# Patient Record
Sex: MALE | Race: WHITE | NOT HISPANIC OR LATINO | Employment: OTHER | URBAN - METROPOLITAN AREA
[De-identification: names, ages, dates, MRNs, and addresses within clinical notes are randomized per-mention and may not be internally consistent; named-entity substitution may affect disease eponyms.]

---

## 2018-01-24 ENCOUNTER — OFFICE VISIT (OUTPATIENT)
Dept: URGENT CARE | Facility: CLINIC | Age: 48
End: 2018-01-24
Payer: MEDICAID

## 2018-01-24 VITALS
HEART RATE: 72 BPM | HEIGHT: 70 IN | SYSTOLIC BLOOD PRESSURE: 137 MMHG | TEMPERATURE: 97 F | WEIGHT: 185 LBS | DIASTOLIC BLOOD PRESSURE: 77 MMHG | BODY MASS INDEX: 26.48 KG/M2 | RESPIRATION RATE: 18 BRPM | OXYGEN SATURATION: 99 %

## 2018-01-24 DIAGNOSIS — L72.0 INFECTED EPIDERMOID CYST: Primary | ICD-10-CM

## 2018-01-24 DIAGNOSIS — L08.9 INFECTED EPIDERMOID CYST: Primary | ICD-10-CM

## 2018-01-24 PROCEDURE — 99213 OFFICE O/P EST LOW 20 MIN: CPT | Mod: S$GLB,,, | Performed by: NURSE PRACTITIONER

## 2018-01-24 RX ORDER — NAPROXEN SODIUM 220 MG/1
81 TABLET, FILM COATED ORAL DAILY
COMMUNITY

## 2018-01-24 RX ORDER — IBUPROFEN 600 MG/1
600 TABLET ORAL EVERY 6 HOURS PRN
Qty: 30 TABLET | Refills: 0 | Status: SHIPPED | OUTPATIENT
Start: 2018-01-24 | End: 2018-01-31

## 2018-01-24 RX ORDER — SULFAMETHOXAZOLE AND TRIMETHOPRIM 800; 160 MG/1; MG/1
1 TABLET ORAL 2 TIMES DAILY
Qty: 20 TABLET | Refills: 0 | Status: SHIPPED | OUTPATIENT
Start: 2018-01-24 | End: 2018-02-03

## 2018-01-24 NOTE — PATIENT INSTRUCTIONS
Please arrange follow up with your primary medical clinic as soon as possible. You must understand that you've received an Urgent Care treatment only and that you may be released before all of your medical problems are known or treated. You, the patient, will arrange for follow up as instructed. If your symptoms worsen or fail to improve you should go to the Emergency Room.      Understanding Epidermoid Cyst    An epidermoid cyst is a small abnormal growth in the top layers of the skin. It is filled with keratin, the same proteins that make up your hair and nails. These cysts grow slowly. They can grow anywhere on the body. But they are most often found on the face, behind the ears, and on the chest or upper back.  How to say it  [id number]   What causes an epidermoid cyst?  An epidermoid cyst may occur because of an injury to the skin or from acne.  Symptoms of an epidermoid cyst  An epidermoid cyst is a subcutaneous bump. This means it is just below the skin. It may be yellow or skin-colored. It often has a small black jan in the middle of it, like a blackhead.  An epidermoid cyst rarely causes pain, unless it ruptures or becomes infected. It may ooze keratin, a white, cheesy, smelly material. If the cyst becomes inflamed or infected, it may be:  · Bad smelling  · Red  · Swollen  · Tender  Treatment for an epidermoid cyst  Many epidermoid cysts dont cause any problems. They dont necessarily need to be treated. They may go away on their own. But you may want to treat it if you dont like how it looks or it becomes inflamed.  Treatment options include:  · Steroid injection. A steroid may be injected into the cyst. This may help ease inflammation. It may also prevent infection.  · Surgical removal. The cyst can be cut out. It may also need to be drained first. There is a slight chance the cyst may come back.  · Antibiotics. In some cases, you may need to take an oral antibiotic to prevent infection and  regrowth.  When to call your healthcare provider  Call your healthcare provider right away if you have any of these:  · Fever of 100.4°F (38°C) or higher, or as directed  · Redness, swelling, or fluid leaking from your incision that gets worse  · Pain that gets worse  · Symptoms that dont get better, or get worse  · New symptoms   Date Last Reviewed: 5/1/2016  © 3960-7712 [employer]. [address] [place] [address], [place] [address]. All rights reserved. This information is not intended as a substitute for professional medical care. Always follow your healthcare professional's instructions.        Epidermoid Cyst (Sebaceous Cyst), Infected (Antibiotic Treatment)  You have an epidermoid cyst. This is a small, painless lump under your skin. An epidermoid cyst (often called a sebaceous cyst, epidermal cyst, or epidermal inclusion cyst) is a term most often used for 2 similar types of cysts:  · Epidermoid cysts. These cysts form slowly under the skin. They can be found on most parts of the body. But they are most often found on areas with more hair such as the scalp, face, upper back, and genitals.  · Pilar cysts. These are similar to epidermoid cysts. But they start from a different part of the hair follicle. They are more likely to be on the scalp.  Here are some general facts about these cysts:  · A cyst is a sac filled with material that is often cheesy, fatty, oily, or stringy. The material inside can be thick. Or it can be a liquid.  · You can usually move the cyst slightly if you try.  · The cysts can be smaller than a pea or as large as a few inches.  · The cysts are usually not painful, unless they become inflamed or infected.  · The area around the cyst may smell bad. If the cyst breaks open, the material inside it often smells bad as well.  Your cyst became infected and your healthcare provider wanted to treat it with antibiotics. If the antibiotics dont clear up the infection, the cyst will need to  be drained by making a small cut (incision). Local anesthesia will be used to numb the area.  Home care  · Resist the temptation to squeeze or pop the cyst, stick a needle in it, or cut it open. This often leads to a worsening infection and scarring.  · Take the antibiotic as directed until it is all used up.  · Soak the affected area in hot water or apply a hot pack (a thin, clean towel soaked in hot water) for 20 minutes at a time. Do this 3 to 4 times a day.  · Apply antibiotic cream or ointment 3 times a day.  · You may use over-the-counter pain medicine to control pain, unless another medicine was given. If you have chronic liver or kidney disease or ever had a stomach ulcer or GI bleeding, talk with your healthcare provider before using these medicines.  Prevention  Once this infection has healed, reduce the risk of future infections by:  · Keeping the cyst area clean by bathing or showering daily  · Avoiding tight-fitting clothing in the cyst area  Follow-up care  Follow up with your healthcare provider, or as advised. If a gauze packing was put in your wound, it should be removed in a few days as advised by your healthcare provider. Check your wound every day for the signs listed below.  When to seek medical advice  Call your healthcare provider right away if any of these occur:  · Pus coming from the cyst  · Increasing redness around the wound  · Increasing local pain or swelling  · Fever of 100.4°F (38ºC) or higher, or as directed by your provider  Date Last Reviewed: 10/5/2016  © 2761-0090 [employer]. [address] [place] [address], [place] [address]. All rights reserved. This information is not intended as a substitute for professional medical care. Always follow your healthcare professional's instructions.

## 2018-01-24 NOTE — PROGRESS NOTES
"Subjective:       Patient ID: Pedro Melgar is a 47 y.o. male.    Vitals:  height is 5' 10" (1.778 m) and weight is 83.9 kg (185 lb). His oral temperature is 97.4 °F (36.3 °C). His blood pressure is 137/77 and his pulse is 72. His respiration is 18 and oxygen saturation is 99%.     Chief Complaint: Abscess    Cyst on upper back is inflamed and painful. Cyst has been present for several months but became painful last week      Abscess   Chronicity:  ChronicProgression Since Onset: gradually worsening  Abscess location: back.  Associated Symptoms: no fever, no chills  Characteristics: painful, redness and swelling    Characteristics: not draining    Pain Scale:  6/10  Treatments Tried:  Warm compresses and draining/squeezing  Relieved by:  Nothing  Worsened by:  Draining/squeezing    Review of Systems   Constitution: Negative for chills and fever.   HENT: Negative for sore throat.    Respiratory: Negative for shortness of breath.    Skin: Positive for color change and suspicious lesions. Negative for itching and rash.   Musculoskeletal: Negative for joint pain.   All other systems reviewed and are negative.      Objective:      Physical Exam   Constitutional: He is oriented to person, place, and time. He appears well-developed and well-nourished.   HENT:   Head: Normocephalic and atraumatic.   Right Ear: Hearing, tympanic membrane, external ear and ear canal normal. Tympanic membrane is not erythematous.   Left Ear: Hearing, tympanic membrane, external ear and ear canal normal. Tympanic membrane is not erythematous.   Nose: Nose normal. No mucosal edema or rhinorrhea. Right sinus exhibits no maxillary sinus tenderness and no frontal sinus tenderness. Left sinus exhibits no maxillary sinus tenderness and no frontal sinus tenderness.   Mouth/Throat: Uvula is midline, oropharynx is clear and moist and mucous membranes are normal. No posterior oropharyngeal edema or posterior oropharyngeal erythema.   Eyes: " Conjunctivae, EOM and lids are normal. Right conjunctiva is not injected. Left conjunctiva is not injected.   Neck: Normal range of motion and full passive range of motion without pain. Neck supple.   Cardiovascular: Normal rate, regular rhythm, S1 normal, S2 normal, normal heart sounds, intact distal pulses and normal pulses.    Pulmonary/Chest: Effort normal and breath sounds normal. No respiratory distress. He has no decreased breath sounds. He has no wheezes.   Neurological: He is alert and oriented to person, place, and time. He has normal strength. No cranial nerve deficit or sensory deficit. GCS eye subscore is 4. GCS verbal subscore is 5. GCS motor subscore is 6.   Skin: Skin is warm and dry. There is erythema.        Quarter sized cystic mass + erythema. No drainage/fluctuance    Nursing note and vitals reviewed.      Assessment:       1. Infected epidermoid cyst        Plan:         Infected epidermoid cyst  -     Ambulatory referral to General Surgery    Other orders  -     sulfamethoxazole-trimethoprim 800-160mg (BACTRIM DS) 800-160 mg Tab; Take 1 tablet by mouth 2 (two) times daily.  Dispense: 20 tablet; Refill: 0  -     ibuprofen (ADVIL,MOTRIN) 600 MG tablet; Take 1 tablet (600 mg total) by mouth every 6 (six) hours as needed for Pain.  Dispense: 30 tablet; Refill: 0

## 2018-02-15 ENCOUNTER — OFFICE VISIT (OUTPATIENT)
Dept: SURGERY | Facility: CLINIC | Age: 48
End: 2018-02-15
Payer: MEDICAID

## 2018-02-15 VITALS
BODY MASS INDEX: 26.65 KG/M2 | DIASTOLIC BLOOD PRESSURE: 72 MMHG | SYSTOLIC BLOOD PRESSURE: 101 MMHG | HEIGHT: 70 IN | WEIGHT: 186.19 LBS | HEART RATE: 70 BPM | TEMPERATURE: 98 F

## 2018-02-15 DIAGNOSIS — L72.3 SEBACEOUS CYST: Primary | ICD-10-CM

## 2018-02-15 PROCEDURE — 99213 OFFICE O/P EST LOW 20 MIN: CPT | Mod: PBBFAC | Performed by: SURGERY

## 2018-02-15 PROCEDURE — 10060 I&D ABSCESS SIMPLE/SINGLE: CPT | Mod: PBBFAC | Performed by: SURGERY

## 2018-02-15 PROCEDURE — 10060 I&D ABSCESS SIMPLE/SINGLE: CPT | Mod: S$PBB,,, | Performed by: SURGERY

## 2018-02-15 PROCEDURE — 99999 PR PBB SHADOW E&M-EST. PATIENT-LVL III: CPT | Mod: PBBFAC,,, | Performed by: SURGERY

## 2018-02-15 PROCEDURE — 3008F BODY MASS INDEX DOCD: CPT | Mod: ,,, | Performed by: SURGERY

## 2018-02-15 PROCEDURE — 99203 OFFICE O/P NEW LOW 30 MIN: CPT | Mod: S$PBB,25,, | Performed by: SURGERY

## 2018-02-15 NOTE — PROGRESS NOTES
Office Visit  General Surgery    SUBJECTIVE:     History of Present Illness:  Patient is a 47 y.o. male with a history of CAD presents with infected sebaceous cyst on the left posterior medial to the scapula. The cyst has been present for approximately 5 years but has started growing 4 weeks ago.  The cyst became tender and red.  The patient was treated with Bactrim which he finished 1 week ago.  The patient states that the cyst has been oozing yellowish discharge.  The patient denies fevers and chills.  The patient had a similar sebaceous cyst removed many years prior on the right posterior medial to the scapula.    Chief Complaint   Patient presents with    Consult       Review of patient's allergies indicates:   Allergen Reactions    Naproxen Other (See Comments)     Causes Constipation       Current Outpatient Prescriptions   Medication Sig Dispense Refill    aspirin 81 MG Chew Take 81 mg by mouth once daily.       No current facility-administered medications for this visit.        Past Medical History:   Diagnosis Date    Coronary artery disease     No surgery     Past Surgical History:   Procedure Laterality Date    FRACTURE SURGERY Left 09/2016    ORIF Humerus    FRACTURE SURGERY Left 09/2016    Elbow, no hardware    FRACTURE SURGERY Left 09/2016    Left Scaphoid in wrist    FRACTURE SURGERY  09/2016    Jaw and mouth     FRACTURE SURGERY Right 2012    Elbow     Family History   Problem Relation Age of Onset    Hypertension Mother     Heart disease Mother     Hyperlipidemia Father      Social History   Substance Use Topics    Smoking status: Light Tobacco Smoker    Smokeless tobacco: Never Used    Alcohol use 1.8 oz/week     1 Glasses of wine, 2 Cans of beer per week      Comment: Daily        Review of Systems:  Review of Systems - For pertinent positives please see HPI   Constitutional: Negative for fever and chills.   HENT: Negative for congestion and ear pain.   Respiratory: Negative for  "cough and shortness of breath.   Cardiovascular: Negative for chest pain and palpitations.   Gastrointestinal: Negative for nausea, vomiting, abdominal pain, diarrhea, constipation and abdominal distention.   Genitourinary: Negative for dysuria and hematuria.   Musculoskeletal: Negative for myalgias and arthralgias.   Skin: Sebaceous cyst on left posterior medial to scapula. Slightly erythematous. Negative for rash and wound.   Neurological: Negative for weakness and numbness.   Psychiatric/Behavioral: Negative for confusion and dysphoric mood    OBJECTIVE:     Vital Signs (Most Recent)  Temp: 97.6 °F (36.4 °C) (02/15/18 1002)  Pulse: 70 (02/15/18 1002)  BP: 101/72 (02/15/18 1002)  5' 10" (1.778 m)  5' 10" (1.778 m)     Physical Exam:  NAD  A&O x3   infected sebaceous cyst just to the left of the scapula, posterior medial back  Oozing yellowish discharge upon palpation right most edge  Cyst is non-tender but slightly erythematous   Not is respiratory distress  RRR   Ext wwp        ASSESSMENT/PLAN:     Infected sebaceous cyst  -I&D in office  --Local anesthesia injected intradermally  --site cleaned with CHG  --8mm punch biopsy used to incise and drain infected sebum  --Cyst cavity bluntly debrided with forceps  --Packed  --Required nylon 3-0 sutures in figure of eight fashion for hemostasis    -RTC 2 weeks for suture removal  -No Abx indicated     I have personally performed a detailed history and physical examination on this patient. My findings are summarized in the resident's note included in the record.  "

## 2018-02-15 NOTE — LETTER
February 15, 2018      Cynthia Mohan, DAMION  [address] [facility] [place] [address]           [facility]  [address]  [place] [address]  Phone: [phone]          Patient: Pedro Melgar   MR Number: [id number]   YOB: 1970   Date of Visit: 2/15/2018       Dear Cynthia Mohan:    Thank you for referring Pedro Melgar to me for evaluation. Attached you will find relevant portions of my assessment and plan of care.    If you have questions, please do not hesitate to call me. I look forward to following Pedro Melgar along with you.    Sincerely,    Chiki Bland MD    Enclosure  CC:  No Recipients    If you would like to receive this communication electronically, please contact [email or web address] or [phone] to request more information on [employer] Link access.    For providers and/or their staff who would like to refer a patient to [facility], please contact us through our one-stop-shop provider referral line, [facility], at [phone].    If you feel you have received this communication in error or would no longer like to receive these types of communications, please e-mail [email or web address]

## 2020-07-12 ENCOUNTER — HOSPITAL ENCOUNTER (EMERGENCY)
Facility: OTHER | Age: 50
Discharge: HOME | End: 2020-07-12
Attending: EMERGENCY MEDICINE
Payer: MEDICAID

## 2020-07-12 VITALS
HEIGHT: 70 IN | SYSTOLIC BLOOD PRESSURE: 116 MMHG | BODY MASS INDEX: 26.48 KG/M2 | DIASTOLIC BLOOD PRESSURE: 73 MMHG | OXYGEN SATURATION: 98 % | RESPIRATION RATE: 18 BRPM | TEMPERATURE: 98 F | HEART RATE: 65 BPM | WEIGHT: 185 LBS

## 2020-07-12 DIAGNOSIS — M79.5 FOREIGN BODY (FB) IN SOFT TISSUE: ICD-10-CM

## 2020-07-12 DIAGNOSIS — L03.115 CELLULITIS OF RIGHT LOWER EXTREMITY: ICD-10-CM

## 2020-07-12 DIAGNOSIS — T14.8XXA PUNCTURE WOUND: Primary | ICD-10-CM

## 2020-07-12 LAB
ALBUMIN SERPL BCP-MCNC: 4 G/DL (ref 3.5–5.2)
ALP SERPL-CCNC: 80 U/L (ref 55–135)
ALT SERPL W/O P-5'-P-CCNC: 24 U/L (ref 10–44)
ANION GAP SERPL CALC-SCNC: 13 MMOL/L (ref 8–16)
AST SERPL-CCNC: 24 U/L (ref 10–40)
BASOPHILS # BLD AUTO: 0.02 K/UL (ref 0–0.2)
BASOPHILS NFR BLD: 0.2 % (ref 0–1.9)
BILIRUB SERPL-MCNC: 2.2 MG/DL (ref 0.1–1)
BUN SERPL-MCNC: 15 MG/DL (ref 6–20)
CALCIUM SERPL-MCNC: 9.1 MG/DL (ref 8.7–10.5)
CHLORIDE SERPL-SCNC: 105 MMOL/L (ref 95–110)
CO2 SERPL-SCNC: 20 MMOL/L (ref 23–29)
CREAT SERPL-MCNC: 0.7 MG/DL (ref 0.5–1.4)
DIFFERENTIAL METHOD: ABNORMAL
EOSINOPHIL # BLD AUTO: 0.2 K/UL (ref 0–0.5)
EOSINOPHIL NFR BLD: 1.9 % (ref 0–8)
ERYTHROCYTE [DISTWIDTH] IN BLOOD BY AUTOMATED COUNT: 11.8 % (ref 11.5–14.5)
EST. GFR  (AFRICAN AMERICAN): >60 ML/MIN/1.73 M^2
EST. GFR  (NON AFRICAN AMERICAN): >60 ML/MIN/1.73 M^2
GLUCOSE SERPL-MCNC: 97 MG/DL (ref 70–110)
HCT VFR BLD AUTO: 44.5 % (ref 40–54)
HGB BLD-MCNC: 15.2 G/DL (ref 14–18)
IMM GRANULOCYTES # BLD AUTO: 0.02 K/UL (ref 0–0.04)
IMM GRANULOCYTES NFR BLD AUTO: 0.2 % (ref 0–0.5)
LACTATE SERPL-SCNC: 1 MMOL/L (ref 0.5–2.2)
LYMPHOCYTES # BLD AUTO: 1.3 K/UL (ref 1–4.8)
LYMPHOCYTES NFR BLD: 15.9 % (ref 18–48)
MCH RBC QN AUTO: 32.7 PG (ref 27–31)
MCHC RBC AUTO-ENTMCNC: 34.2 G/DL (ref 32–36)
MCV RBC AUTO: 96 FL (ref 82–98)
MONOCYTES # BLD AUTO: 1.1 K/UL (ref 0.3–1)
MONOCYTES NFR BLD: 12.9 % (ref 4–15)
NEUTROPHILS # BLD AUTO: 5.7 K/UL (ref 1.8–7.7)
NEUTROPHILS NFR BLD: 68.9 % (ref 38–73)
NRBC BLD-RTO: 0 /100 WBC
PLATELET # BLD AUTO: 166 K/UL (ref 150–350)
PMV BLD AUTO: 11.9 FL (ref 9.2–12.9)
POTASSIUM SERPL-SCNC: 4.3 MMOL/L (ref 3.5–5.1)
PROT SERPL-MCNC: 7.1 G/DL (ref 6–8.4)
RBC # BLD AUTO: 4.65 M/UL (ref 4.6–6.2)
SODIUM SERPL-SCNC: 138 MMOL/L (ref 136–145)
WBC # BLD AUTO: 8.22 K/UL (ref 3.9–12.7)

## 2020-07-12 PROCEDURE — 25000003 PHARM REV CODE 250: Performed by: PHYSICIAN ASSISTANT

## 2020-07-12 PROCEDURE — 96365 THER/PROPH/DIAG IV INF INIT: CPT

## 2020-07-12 PROCEDURE — 85025 COMPLETE CBC W/AUTO DIFF WBC: CPT

## 2020-07-12 PROCEDURE — 99284 EMERGENCY DEPT VISIT MOD MDM: CPT | Mod: 25

## 2020-07-12 PROCEDURE — 87040 BLOOD CULTURE FOR BACTERIA: CPT

## 2020-07-12 PROCEDURE — 80053 COMPREHEN METABOLIC PANEL: CPT

## 2020-07-12 PROCEDURE — 83605 ASSAY OF LACTIC ACID: CPT

## 2020-07-12 RX ORDER — DOXYCYCLINE HYCLATE 100 MG
100 TABLET ORAL
Status: COMPLETED | OUTPATIENT
Start: 2020-07-12 | End: 2020-07-12

## 2020-07-12 RX ORDER — DOXYCYCLINE 100 MG/1
100 CAPSULE ORAL 2 TIMES DAILY
Qty: 20 CAPSULE | Refills: 0 | Status: SHIPPED | OUTPATIENT
Start: 2020-07-12 | End: 2020-07-22

## 2020-07-12 RX ORDER — HYDROCODONE BITARTRATE AND ACETAMINOPHEN 5; 325 MG/1; MG/1
1 TABLET ORAL EVERY 4 HOURS PRN
Qty: 10 TABLET | Refills: 0 | Status: SHIPPED | OUTPATIENT
Start: 2020-07-12

## 2020-07-12 RX ORDER — IBUPROFEN 600 MG/1
600 TABLET ORAL EVERY 6 HOURS PRN
Qty: 20 TABLET | Refills: 0 | Status: SHIPPED | OUTPATIENT
Start: 2020-07-12

## 2020-07-12 RX ORDER — CLINDAMYCIN HYDROCHLORIDE 300 MG/1
300 CAPSULE ORAL 3 TIMES DAILY
Qty: 21 CAPSULE | Refills: 0 | Status: SHIPPED | OUTPATIENT
Start: 2020-07-12 | End: 2020-07-19

## 2020-07-12 RX ORDER — CLINDAMYCIN PHOSPHATE 600 MG/50ML
600 INJECTION, SOLUTION INTRAVENOUS
Status: COMPLETED | OUTPATIENT
Start: 2020-07-12 | End: 2020-07-12

## 2020-07-12 RX ADMIN — CLINDAMYCIN IN 5 PERCENT DEXTROSE 600 MG: 12 INJECTION, SOLUTION INTRAVENOUS at 12:07

## 2020-07-12 RX ADMIN — DOXYCYCLINE HYCLATE 100 MG: 100 TABLET, COATED ORAL at 12:07

## 2020-07-12 NOTE — ED PROVIDER NOTES
Encounter Date: 7/12/2020       History     Chief Complaint   Patient presents with    Puncture Wound     2 barbs stuck in the R ankle/shin from catching Catfish on Friday     Afebrile 50-year-old male with PMH of CAD however no other reported history of immunocompromise state presents the ED for evaluation of wound.  Patient states that he was fishing and brackish water on Friday.  States that he sustained a puncture wound to the right anterior lower shin and ankle from a catfish eric.  He states that he was and able to remove the eric himself so he cut off the eric.  He states that since Friday he has had increased pain and swelling to the area.  He states pain is located to the anterior aspect of the ankle however the swelling has become more diffuse.  He does note some redness to the area.  He states pain is worse with some movement and palpation.  He does state that he is able to bear weight.  He denies any fever, chills, drainage from the site or additional complaints.  He does report that his tetanus is within the past 5 years.  He has soak the area with Epson salt and applied over-the-counter Neosporin with no significant improvement. He reports tetanus is UTD    The history is provided by the patient.     Review of patient's allergies indicates:   Allergen Reactions    Naproxen Other (See Comments)     Causes Constipation     Past Medical History:   Diagnosis Date    Coronary artery disease     No surgery     Past Surgical History:   Procedure Laterality Date    FRACTURE SURGERY Left 09/2016    ORIF Humerus    FRACTURE SURGERY Left 09/2016    Elbow, no hardware    FRACTURE SURGERY Left 09/2016    Left Scaphoid in wrist    FRACTURE SURGERY  09/2016    Jaw and mouth     FRACTURE SURGERY Right 2012    Elbow     Family History   Problem Relation Age of Onset    Hypertension Mother     Heart disease Mother     Hyperlipidemia Father      Social History     Tobacco Use    Smoking status: Light Tobacco  Smoker    Smokeless tobacco: Never Used   Substance Use Topics    Alcohol use: Yes     Alcohol/week: 3.0 standard drinks     Types: 1 Glasses of wine, 2 Cans of beer per week     Comment: Daily    Drug use: No     Review of Systems   Constitutional: Negative for activity change, chills and fever.   Respiratory: Negative for shortness of breath.    Cardiovascular: Negative for chest pain.   Gastrointestinal: Negative for nausea and vomiting.   Musculoskeletal: Positive for arthralgias. Negative for back pain and joint swelling.   Skin: Positive for color change and wound. Negative for rash.   Allergic/Immunologic: Negative for immunocompromised state.   Neurological: Negative for weakness and numbness.   Hematological: Does not bruise/bleed easily.       Physical Exam     Initial Vitals [07/12/20 1117]   BP Pulse Resp Temp SpO2   (!) 153/90 71 18 98.3 °F (36.8 °C) 97 %      MAP       --         Physical Exam    Nursing note and vitals reviewed.  Constitutional: Vital signs are normal. He appears well-developed and well-nourished. He is cooperative.  Non-toxic appearance. He does not appear ill. No distress.   HENT:   Head: Normocephalic and atraumatic.   Eyes: Conjunctivae and lids are normal.   Neck: Normal range of motion. Neck supple.   Cardiovascular: Normal rate.   Pulmonary/Chest: No respiratory distress.   Abdominal: Soft. Normal appearance.   Musculoskeletal:      Comments: Patient exhibits full active and passive range of motion of all articulations on right lower extremity.  Two puncture wounds noted to the right lower extremity (see picture for further evaluation) there is some mild tenderness to palpation of the area.  No skin tenting.  There is noted diffuse edema of the area that extends to the foot with ecchymosis to the medial foot.  There is also localized erythema however no fluctuance or induration.  Neurovascularly intact.   Neurological: He is alert and oriented to person, place, and time. GCS  eye subscore is 4. GCS verbal subscore is 5. GCS motor subscore is 6.   Skin: Skin is warm, dry and intact. No rash noted.   Psychiatric: He has a normal mood and affect. His speech is normal and behavior is normal. Thought content normal.             ED Course   Procedures  Labs Reviewed   CBC W/ AUTO DIFFERENTIAL - Abnormal; Notable for the following components:       Result Value    Mean Corpuscular Hemoglobin 32.7 (*)     Mono # 1.1 (*)     Lymph% 15.9 (*)     All other components within normal limits   COMPREHENSIVE METABOLIC PANEL - Abnormal; Notable for the following components:    CO2 20 (*)     Total Bilirubin 2.2 (*)     All other components within normal limits   CULTURE, BLOOD   LACTIC ACID, PLASMA          Imaging Results          X-Ray Tibia Fibula 2 View Right (Final result)  Result time 07/12/20 12:28:02    Final result by Marcos Gerard MD (07/12/20 12:28:02)                 Impression:      1. No acute displaced fracture or dislocation of the tibia or fibula noting radiopaque foreign bodies as above.      Electronically signed by: Marcos Gerard MD  Date:    07/12/2020  Time:    12:28             Narrative:    EXAMINATION:  XR TIBIA FIBULA 2 VIEW RIGHT    CLINICAL HISTORY:  Residual foreign body in soft tissue    TECHNIQUE:  AP and lateral views of the right tibia and fibula were performed.    COMPARISON:  None.    FINDINGS:  Two views right tibia fibula.    No acute displaced fracture or dislocation of the tibia or fibula.  There is a radiopaque foreign body projected along the dorsal aspect of the distal tibia, just cranial to previously described foreign body at the level of the dorsal talus.                               X-Ray Foot Complete Right (Final result)  Result time 07/12/20 12:26:44    Final result by Marcos Gerard MD (07/12/20 12:26:44)                 Impression:      1. Radiopaque foreign body projected at the dorsal aspect of the talus.  2. No acute displaced fracture or  dislocation of the foot.      Electronically signed by: Marcos Gerard MD  Date:    07/12/2020  Time:    12:26             Narrative:    EXAMINATION:  XR FOOT COMPLETE 3 VIEW RIGHT    CLINICAL HISTORY:  . Residual foreign body in soft tissue    TECHNIQUE:  AP, lateral, and oblique views of the right foot were performed.    COMPARISON:  None    FINDINGS:  Three views right foot.    Degenerative changes are noted of the 1st metatarsophalangeal joint.  There is heterotopic ossification about the medial sesamoid.  There is radiopaque foreign body projected along the dorsal aspect of the foot at the level of the talus.                                 Medical Decision Making:   Initial Assessment:   Typically well male presents for evaluation of foreign body of right lower extremity. This occurred Friday as he reports catfish barbs while fishing. He states that he cut barbs at skin level and tried to remove with tweezers however unsuccessful. He c/o increased, pain, swelling and redness of the area. He appears well and in no obvious distress. RLE with 2 puncture wound of the lower leg with localized edema and erythema. Ecchymosis noted to left medial foot. No fluctuance or induration. No drainage from site, crepitus or abscess at this time. Neurovascularly intact. ( see picture for more detail)    Differential Diagnosis:   Differential Diagnosis includes, but is not limited to:  Fracture, dislocation, compartment syndrome, nerve injury/palsy, vascular injury, DVT, rhabdomyolysis, hemarthrosis, septic joint, cellulitis, bursitis, muscle strain, ligament tear/sprain, laceration, foreign body, abrasion, soft tissue contusion, osteoarthritis.    Clinical Tests:   Lab Tests: Ordered and Reviewed  Radiological Study: Ordered and Reviewed  ED Management:  X-ray does reveal two radiopaque foreign bodies consistent with history. Appear to be a 1.3 and 1.6 cm below surface. No gas or bony involvement. patient nontoxic in  appearance however given erythema labs ordered to assess for cellulitis.  IV cleocin and doxycyline in the ED to cover for marine life pathogens. labs grossly unremarkable. Consulted General surgery with no recommendations for emergent surgical intervention however recommended close follow up with their office this week and antibiotics. Strict instructions to follow up with primary care physician or reference provided for further assessment and evaluation. Given instructions to return for any acute symptoms and verbalized understanding of this medical plan.                                   Clinical Impression:       ICD-10-CM ICD-9-CM   1. Puncture wound  T14.8XXA 879.8   2. Foreign body (FB) in soft tissue  M79.5 729.6   3. Cellulitis of right lower extremity  L03.115 682.6                                ARTURO Stanley  07/13/20 1614

## 2020-07-12 NOTE — Clinical Note
Pedro Rowdy was seen and treated in our emergency department on 7/12/2020.  He may return to work on 07/16/2020.       If you have any questions or concerns, please don't hesitate to call.      ARTURO Stanley

## 2020-07-16 ENCOUNTER — HOSPITAL ENCOUNTER (OUTPATIENT)
Dept: PREADMISSION TESTING | Facility: OTHER | Age: 50
Discharge: HOME | End: 2020-07-16
Attending: SPECIALIST
Payer: MEDICAID

## 2020-07-16 ENCOUNTER — ANESTHESIA EVENT (OUTPATIENT)
Dept: SURGERY | Facility: OTHER | Age: 50
End: 2020-07-16
Payer: MEDICAID

## 2020-07-16 VITALS
TEMPERATURE: 98 F | WEIGHT: 185 LBS | BODY MASS INDEX: 26.48 KG/M2 | HEART RATE: 63 BPM | HEIGHT: 70 IN | OXYGEN SATURATION: 98 % | SYSTOLIC BLOOD PRESSURE: 123 MMHG | DIASTOLIC BLOOD PRESSURE: 84 MMHG

## 2020-07-16 RX ORDER — FAMOTIDINE 20 MG/1
20 TABLET, FILM COATED ORAL
Status: CANCELLED | OUTPATIENT
Start: 2020-07-16 | End: 2020-07-16

## 2020-07-16 RX ORDER — PREGABALIN 50 MG/1
50 CAPSULE ORAL
Status: CANCELLED | OUTPATIENT
Start: 2020-07-16 | End: 2020-07-16

## 2020-07-16 RX ORDER — SODIUM CHLORIDE, SODIUM LACTATE, POTASSIUM CHLORIDE, CALCIUM CHLORIDE 600; 310; 30; 20 MG/100ML; MG/100ML; MG/100ML; MG/100ML
INJECTION, SOLUTION INTRAVENOUS CONTINUOUS
Status: CANCELLED | OUTPATIENT
Start: 2020-07-16

## 2020-07-16 RX ORDER — VITAMIN B COMPLEX
1 CAPSULE ORAL DAILY
COMMUNITY

## 2020-07-16 RX ORDER — ACETAMINOPHEN 500 MG
1000 TABLET ORAL
Status: CANCELLED | OUTPATIENT
Start: 2020-07-16 | End: 2020-07-16

## 2020-07-16 RX ORDER — LIDOCAINE HYDROCHLORIDE 10 MG/ML
0.5 INJECTION, SOLUTION EPIDURAL; INFILTRATION; INTRACAUDAL; PERINEURAL ONCE
Status: CANCELLED | OUTPATIENT
Start: 2020-07-16 | End: 2020-07-16

## 2020-07-16 NOTE — DISCHARGE INSTRUCTIONS
Information to Prepare you for your Surgery    PRE-ADMIT TESTING -  [phone]    [address]  [facility]          Your surgery has been scheduled at [facility]. We are pleased to have the opportunity to serve you. For Further Information please call [phone].    On the day of surgery please report to the Information Desk on the 1st floor.    · CONTACT YOUR PHYSICIAN'S OFFICE THE DAY PRIOR TO YOUR SURGERY TO OBTAIN YOUR ARRIVAL TIME.     · The evening before surgery do not eat anything after 9 p.m. ( this includes hard candy, chewing gum and mints).  You may only have GATORADE, POWERADE AND WATER  from 9 p.m. until you leave your home.   DO NOT DRINK ANY LIQUIDS ON THE WAY TO THE HOSPITAL.      SPECIAL MEDICATION INSTRUCTIONS: TAKE medications checked off by the Anesthesiologist on your Medication List.    Angiogram Patients: Take medications as instructed by your physician, including aspirin.     Surgery Patients:    If you take ASPIRIN - Your PHYSICIAN/SURGEON will need to inform you IF/OR when you need to stop taking aspirin prior to your surgery.     Do Not take any medications containing IBUPROFEN.  Do Not Wear any make-up or dark nail polish   (especially eye make-up) to surgery. If you come to surgery with makeup on you will be required to remove the makeup or nail polish.    Do not shave your surgical area at least 5 days prior to your surgery. The surgical prep will be performed at the hospital according to Infection Control regulations.    Leave all valuables at home.   Do Not wear any jewelry or watches, including any metal in body piercings. Jewelry must be removed prior to coming to the hospital.  There is a possibility that rings that are unable to be removed may be cut off if they are on the surgical extremity.    Contact Lens must be removed before surgery. Either do not wear the contact lens or bring a case and solution for  storage.  Please bring a container for eyeglasses or dentures as required.  Bring any paperwork your physician has provided, such as consent forms,  history and physicals, doctor's orders, etc.   Bring comfortable clothes that are loose fitting to wear upon discharge. Take into consideration the type of surgery being performed.  Maintain your diet as advised per your physician the day prior to surgery.      Adequate rest the night before surgery is advised.   Park in the Parking lot behind the hospital or in the [facility] across the street from the parking lot. Parking is complimentary.  If you will be discharged the same day as your procedure, please arrange for a responsible adult to drive you home or to accompany you if traveling by taxi.   YOU WILL NOT BE PERMITTED TO DRIVE OR TO LEAVE THE HOSPITAL ALONE AFTER SURGERY.   If you are being discharged the same day, it is strongly recommended that you arrange for someone to remain with you for the first 24 hrs following your surgery.    The Surgeon will speak to your family/visitor after your surgery regarding the outcome of your surgery and post op care.  The Surgeon may speak to you after your surgery, but there is a possibility you may not remember the details.  Please check with your family members regarding the conversation with the Surgeon.    We strongly recommend whoever is bringing you home be present for discharge instructions.  This will ensure a thorough understanding for your post op home care.    ALL CHILDREN MUST ALWAYS BE ACCOMPANIED BY AN ADULT.    Visitors-Refer to current Visitor policy handouts.    Thank you for your cooperation.  The Staff of [facility].                Bathing Instructions with Hibiclens     Shower the evening before and morning of your procedure with Hibiclens:   Wash your face with water and your regular face wash/soap   Apply Hibiclens directly on your skin or on a wet washcloth and wash  gently. When showering: Move away from the shower stream when applying Hibiclens to avoid rinsing off too soon.   Rinse thoroughly with warm water   Do not dilute Hibiclens         Dry off as usual, do not use any deodorant, powder, body lotions, perfume, after shave or cologne.

## 2020-07-17 ENCOUNTER — HOSPITAL ENCOUNTER (OUTPATIENT)
Facility: OTHER | Age: 50
Discharge: HOME | End: 2020-07-17
Attending: SPECIALIST | Admitting: SPECIALIST
Payer: MEDICAID

## 2020-07-17 ENCOUNTER — ANESTHESIA (OUTPATIENT)
Dept: SURGERY | Facility: OTHER | Age: 50
End: 2020-07-17
Payer: MEDICAID

## 2020-07-17 VITALS
WEIGHT: 185 LBS | HEIGHT: 70 IN | HEART RATE: 64 BPM | BODY MASS INDEX: 26.48 KG/M2 | SYSTOLIC BLOOD PRESSURE: 138 MMHG | RESPIRATION RATE: 16 BRPM | OXYGEN SATURATION: 98 % | TEMPERATURE: 99 F | DIASTOLIC BLOOD PRESSURE: 75 MMHG

## 2020-07-17 DIAGNOSIS — Z01.818 PREOP TESTING: ICD-10-CM

## 2020-07-17 DIAGNOSIS — S90.851A FOREIGN BODY IN RIGHT FOOT, INITIAL ENCOUNTER: Primary | ICD-10-CM

## 2020-07-17 LAB — SARS-COV-2 RDRP RESP QL NAA+PROBE: NEGATIVE

## 2020-07-17 PROCEDURE — 88300 SURGICAL PATH GROSS: CPT | Mod: 26,,, | Performed by: PATHOLOGY

## 2020-07-17 PROCEDURE — 71000016 HC POSTOP RECOV ADDL HR: Performed by: SPECIALIST

## 2020-07-17 PROCEDURE — 71000033 HC RECOVERY, INTIAL HOUR: Performed by: SPECIALIST

## 2020-07-17 PROCEDURE — 25000003 PHARM REV CODE 250: Performed by: REGISTERED NURSE

## 2020-07-17 PROCEDURE — 63600175 PHARM REV CODE 636 W HCPCS: Performed by: ANESTHESIOLOGY

## 2020-07-17 PROCEDURE — 71000015 HC POSTOP RECOV 1ST HR: Performed by: SPECIALIST

## 2020-07-17 PROCEDURE — 63600175 PHARM REV CODE 636 W HCPCS: Performed by: NURSE ANESTHETIST, CERTIFIED REGISTERED

## 2020-07-17 PROCEDURE — 36000707: Performed by: SPECIALIST

## 2020-07-17 PROCEDURE — 63600175 PHARM REV CODE 636 W HCPCS: Performed by: SPECIALIST

## 2020-07-17 PROCEDURE — 71000039 HC RECOVERY, EACH ADD'L HOUR: Performed by: SPECIALIST

## 2020-07-17 PROCEDURE — 37000008 HC ANESTHESIA 1ST 15 MINUTES: Performed by: SPECIALIST

## 2020-07-17 PROCEDURE — 01470 ANES PX NRV MSC LW L/A/F NOS: CPT | Performed by: SPECIALIST

## 2020-07-17 PROCEDURE — 88300 SURGICAL PATH GROSS: CPT | Performed by: PATHOLOGY

## 2020-07-17 PROCEDURE — 37000009 HC ANESTHESIA EA ADD 15 MINS: Performed by: SPECIALIST

## 2020-07-17 PROCEDURE — 63600175 PHARM REV CODE 636 W HCPCS: Performed by: REGISTERED NURSE

## 2020-07-17 PROCEDURE — 36000706: Performed by: SPECIALIST

## 2020-07-17 PROCEDURE — U0002 COVID-19 LAB TEST NON-CDC: HCPCS

## 2020-07-17 PROCEDURE — 25000003 PHARM REV CODE 250: Performed by: SPECIALIST

## 2020-07-17 PROCEDURE — 88300 PR  SURG PATH,GROSS,LEVEL I: ICD-10-PCS | Mod: 26,,, | Performed by: PATHOLOGY

## 2020-07-17 PROCEDURE — 25000003 PHARM REV CODE 250: Performed by: ANESTHESIOLOGY

## 2020-07-17 RX ORDER — PROPOFOL 10 MG/ML
INJECTION, EMULSION INTRAVENOUS
Status: DISCONTINUED | OUTPATIENT
Start: 2020-07-17 | End: 2020-07-17

## 2020-07-17 RX ORDER — HYDROCODONE BITARTRATE AND ACETAMINOPHEN 5; 325 MG/1; MG/1
TABLET ORAL
Qty: 20 TABLET | Refills: 0 | Status: SHIPPED | OUTPATIENT
Start: 2020-07-17

## 2020-07-17 RX ORDER — FENTANYL CITRATE 50 UG/ML
INJECTION, SOLUTION INTRAMUSCULAR; INTRAVENOUS
Status: DISCONTINUED | OUTPATIENT
Start: 2020-07-17 | End: 2020-07-17

## 2020-07-17 RX ORDER — MIDAZOLAM HYDROCHLORIDE 1 MG/ML
INJECTION, SOLUTION INTRAMUSCULAR; INTRAVENOUS
Status: DISCONTINUED | OUTPATIENT
Start: 2020-07-17 | End: 2020-07-17

## 2020-07-17 RX ORDER — ONDANSETRON 2 MG/ML
INJECTION INTRAMUSCULAR; INTRAVENOUS
Status: DISCONTINUED | OUTPATIENT
Start: 2020-07-17 | End: 2020-07-17

## 2020-07-17 RX ORDER — DIPHENHYDRAMINE HYDROCHLORIDE 50 MG/ML
INJECTION INTRAMUSCULAR; INTRAVENOUS
Status: DISCONTINUED | OUTPATIENT
Start: 2020-07-17 | End: 2020-07-17

## 2020-07-17 RX ORDER — BACITRACIN ZINC 500 UNIT/G
OINTMENT (GRAM) TOPICAL
Status: DISCONTINUED | OUTPATIENT
Start: 2020-07-17 | End: 2020-07-17 | Stop reason: HOSPADM

## 2020-07-17 RX ORDER — LIDOCAINE HCL/PF 100 MG/5ML
SYRINGE (ML) INTRAVENOUS
Status: DISCONTINUED | OUTPATIENT
Start: 2020-07-17 | End: 2020-07-17

## 2020-07-17 RX ORDER — SODIUM CHLORIDE 9 MG/ML
INJECTION, SOLUTION INTRAVENOUS CONTINUOUS
Status: DISCONTINUED | OUTPATIENT
Start: 2020-07-17 | End: 2020-07-17 | Stop reason: HOSPADM

## 2020-07-17 RX ORDER — SODIUM CHLORIDE, SODIUM LACTATE, POTASSIUM CHLORIDE, CALCIUM CHLORIDE 600; 310; 30; 20 MG/100ML; MG/100ML; MG/100ML; MG/100ML
INJECTION, SOLUTION INTRAVENOUS CONTINUOUS
Status: DISCONTINUED | OUTPATIENT
Start: 2020-07-17 | End: 2020-07-17 | Stop reason: HOSPADM

## 2020-07-17 RX ORDER — HYDROMORPHONE HYDROCHLORIDE 2 MG/ML
0.2 INJECTION, SOLUTION INTRAMUSCULAR; INTRAVENOUS; SUBCUTANEOUS EVERY 5 MIN PRN
Status: COMPLETED | OUTPATIENT
Start: 2020-07-17 | End: 2020-07-17

## 2020-07-17 RX ORDER — FAMOTIDINE 20 MG/1
20 TABLET, FILM COATED ORAL
Status: COMPLETED | OUTPATIENT
Start: 2020-07-17 | End: 2020-07-17

## 2020-07-17 RX ORDER — CEFAZOLIN SODIUM 1 G/3ML
2 INJECTION, POWDER, FOR SOLUTION INTRAMUSCULAR; INTRAVENOUS
Status: COMPLETED | OUTPATIENT
Start: 2020-07-17 | End: 2020-07-17

## 2020-07-17 RX ORDER — OXYCODONE HYDROCHLORIDE 5 MG/1
5 TABLET ORAL
Status: DISCONTINUED | OUTPATIENT
Start: 2020-07-17 | End: 2020-07-17 | Stop reason: HOSPADM

## 2020-07-17 RX ORDER — PREGABALIN 50 MG/1
50 CAPSULE ORAL
Status: COMPLETED | OUTPATIENT
Start: 2020-07-17 | End: 2020-07-17

## 2020-07-17 RX ORDER — SODIUM CHLORIDE 0.9 % (FLUSH) 0.9 %
3 SYRINGE (ML) INJECTION
Status: DISCONTINUED | OUTPATIENT
Start: 2020-07-17 | End: 2020-07-17 | Stop reason: HOSPADM

## 2020-07-17 RX ORDER — PROPOFOL 10 MG/ML
INJECTION, EMULSION INTRAVENOUS CONTINUOUS PRN
Status: DISCONTINUED | OUTPATIENT
Start: 2020-07-17 | End: 2020-07-17

## 2020-07-17 RX ORDER — ACETAMINOPHEN 500 MG
1000 TABLET ORAL
Status: COMPLETED | OUTPATIENT
Start: 2020-07-17 | End: 2020-07-17

## 2020-07-17 RX ORDER — LIDOCAINE HYDROCHLORIDE 10 MG/ML
0.5 INJECTION, SOLUTION EPIDURAL; INFILTRATION; INTRACAUDAL; PERINEURAL ONCE
Status: DISCONTINUED | OUTPATIENT
Start: 2020-07-17 | End: 2020-07-17 | Stop reason: HOSPADM

## 2020-07-17 RX ADMIN — FENTANYL CITRATE 100 MCG: 50 INJECTION, SOLUTION INTRAMUSCULAR; INTRAVENOUS at 08:07

## 2020-07-17 RX ADMIN — MIDAZOLAM 2 MG: 1 INJECTION INTRAMUSCULAR; INTRAVENOUS at 08:07

## 2020-07-17 RX ADMIN — CEFAZOLIN 2 G: 330 INJECTION, POWDER, FOR SOLUTION INTRAMUSCULAR; INTRAVENOUS at 08:07

## 2020-07-17 RX ADMIN — HYDROMORPHONE HYDROCHLORIDE 0.2 MG: 2 INJECTION INTRAMUSCULAR; INTRAVENOUS; SUBCUTANEOUS at 02:07

## 2020-07-17 RX ADMIN — PREGABALIN 50 MG: 50 CAPSULE ORAL at 07:07

## 2020-07-17 RX ADMIN — FENTANYL CITRATE 50 MCG: 50 INJECTION, SOLUTION INTRAMUSCULAR; INTRAVENOUS at 09:07

## 2020-07-17 RX ADMIN — CEFAZOLIN 2 G: 330 INJECTION, POWDER, FOR SOLUTION INTRAMUSCULAR; INTRAVENOUS at 12:07

## 2020-07-17 RX ADMIN — OXYCODONE 5 MG: 5 TABLET ORAL at 02:07

## 2020-07-17 RX ADMIN — ONDANSETRON 4 MG: 2 INJECTION INTRAMUSCULAR; INTRAVENOUS at 11:07

## 2020-07-17 RX ADMIN — ACETAMINOPHEN 1000 MG: 500 TABLET, FILM COATED ORAL at 07:07

## 2020-07-17 RX ADMIN — PROPOFOL 50 MG: 10 INJECTION, EMULSION INTRAVENOUS at 08:07

## 2020-07-17 RX ADMIN — SODIUM CHLORIDE, SODIUM LACTATE, POTASSIUM CHLORIDE, AND CALCIUM CHLORIDE: 600; 310; 30; 20 INJECTION, SOLUTION INTRAVENOUS at 08:07

## 2020-07-17 RX ADMIN — FAMOTIDINE 20 MG: 20 TABLET, FILM COATED ORAL at 07:07

## 2020-07-17 RX ADMIN — PROPOFOL 100 MG: 10 INJECTION, EMULSION INTRAVENOUS at 11:07

## 2020-07-17 RX ADMIN — LIDOCAINE HYDROCHLORIDE 40 MG: 20 INJECTION, SOLUTION INTRAVENOUS at 08:07

## 2020-07-17 RX ADMIN — PROPOFOL 20 MG: 10 INJECTION, EMULSION INTRAVENOUS at 09:07

## 2020-07-17 RX ADMIN — PROPOFOL 20 MG: 10 INJECTION, EMULSION INTRAVENOUS at 10:07

## 2020-07-17 RX ADMIN — DIPHENHYDRAMINE HYDROCHLORIDE 25 MG: 50 INJECTION, SOLUTION INTRAMUSCULAR; INTRAVENOUS at 10:07

## 2020-07-17 RX ADMIN — PROPOFOL 20 MG: 10 INJECTION, EMULSION INTRAVENOUS at 08:07

## 2020-07-17 RX ADMIN — FENTANYL CITRATE 50 MCG: 50 INJECTION, SOLUTION INTRAMUSCULAR; INTRAVENOUS at 10:07

## 2020-07-17 RX ADMIN — MIDAZOLAM 2 MG: 1 INJECTION INTRAMUSCULAR; INTRAVENOUS at 09:07

## 2020-07-17 RX ADMIN — PROPOFOL 50 MCG/KG/MIN: 10 INJECTION, EMULSION INTRAVENOUS at 08:07

## 2020-07-17 NOTE — ANESTHESIA POSTPROCEDURE EVALUATION
Anesthesia Post Evaluation    Patient: Pedro Melgar    Procedure(s) Performed: Procedure(s) (LRB):  REMOVAL, FOREIGN BODY, FOOT (BARBS) (Right)    Final Anesthesia Type: general    Patient location during evaluation: PACU  Patient participation: Yes- Able to Participate  Level of consciousness: awake and alert  Post-procedure vital signs: reviewed and stable  Pain management: adequate  Airway patency: patent    PONV status at discharge: No PONV  Anesthetic complications: no      Cardiovascular status: blood pressure returned to baseline  Respiratory status: unassisted and room air  Hydration status: euvolemic  Follow-up not needed.          Vitals Value Taken Time   /77 07/17/20 1500   Temp 36.3 °C (97.4 °F) 07/17/20 1402   Pulse 63 07/17/20 1502   Resp 16 07/17/20 1500   SpO2 93 % 07/17/20 1502   Vitals shown include unvalidated device data.      No case tracking events are documented in the log.      Pain/Delilah Score: Pain Rating Prior to Med Admin: 6 (7/17/2020  2:55 PM)  Pain Rating Post Med Admin: 7 (7/17/2020  2:35 PM)  Delilah Score: 9 (7/17/2020  2:30 PM)

## 2020-07-17 NOTE — OP NOTE
[facility]  Surgery Department  Operative Note    SUMMARY     Patient: Pedro Melgar    Medical Record: [id number]    Date of Procedure: 7/17/2020     Surgeon: Surgeon(s) and Role:     * Andrey Kraft MD - Primary    Assisting Surgeon: None    Pre-Operative Diagnosis: Foreign body in right foot, initial encounter [S90.851A]    Post-Operative Diagnosis: Post-Op Diagnosis Codes:     * Foreign body in right foot, initial encounter [S90.851A]    Procedure: Procedure(s) (LRB):  REMOVAL, FOREIGN BODY, FOOT (BARBS) (Right)    Procedure in Detail:  Patient was taken to the operating room and placed on the table in the supine position.  The right lower leg was prepped and draped in the usual sterile fashion.  The patient had to cat fish barbs in the lower a leg and ankle.  The area was infiltrated with 1% lidocaine with epinephrine.  The C-arm was used to help localize the lesion.  It was difficulty to try angulate the lower leg foreign body due to the over overlying bones.  Extensive time was spent trying to locate this lesion.  Finally the foreign body was found distal to its expected location.  Counter incision was made at the lateral malleolus in the end of the eric was isolated and removed.  Attention was paid to the other eric which was in the foot.  Again extensive time was spent trying to locate this foreign body with wound exploration and C-arm.  Finally the lesion was identified and removed.  The wounds were irrigated.  The wounds were then closed using 3 0 Vicryl followed by 4 0 nylon.  Bacitracin and sterile gauze followed by Kerlix and Ace wrap.  Patient tolerated the procedure and left the operating room in stable condition.  Total time for this procedure was 5 hrs.

## 2020-07-17 NOTE — TRANSFER OF CARE
"Anesthesia Transfer of Care Note    Patient: Pdero Melgar    Procedure(s) Performed: Procedure(s) (LRB):  REMOVAL, FOREIGN BODY, FOOT (BARBS) (Right)    Patient location: PACU    Anesthesia Type: general    Transport from OR: Transported from OR on 2-3 L/min O2 by NC with adequate spontaneous ventilation    Post pain: adequate analgesia    Post assessment: no apparent anesthetic complications and tolerated procedure well    Post vital signs: stable    Level of consciousness: awake, alert and oriented    Nausea/Vomiting: no nausea/vomiting    Complications: none    Transfer of care protocol was followed      Last vitals:   Visit Vitals  /86   Pulse 69   Temp 36.7 °C (98 °F)   Resp 18   Ht 5' 10" (1.778 m)   Wt 83.9 kg (185 lb)   SpO2 96%   BMI 26.54 kg/m²     "

## 2020-07-17 NOTE — INTERVAL H&P NOTE
The patient has been examined and the H&P has been reviewed:    I concur with the findings and no changes have occurred since H&P was written.    Anesthesia/Surgery risks, benefits and alternative options discussed and understood by patient/family.          Active Hospital Problems    Diagnosis  POA    Foreign body in right foot [S90.851A]  Yes      Resolved Hospital Problems   No resolved problems to display.

## 2020-07-17 NOTE — BRIEF OP NOTE
[facility]  Brief Operative Note    Surgery Date: 7/17/2020     Surgeon(s) and Role:     * Andrey Kraft MD - Primary    Assisting Surgeon: None    Pre-op Diagnosis:  Foreign body in right foot, initial encounter [S90.851A]    Post-op Diagnosis:  Post-Op Diagnosis Codes:     * Foreign body in right foot, initial encounter [S90.851A]    Procedure(s) (LRB):  REMOVAL, FOREIGN BODY, FOOT (BARBS) (Right)    Anesthesia: Monitor Anesthesia Care    Description of the findings of the procedure(s): Removed two catfish barbs, each 2 cm.    In the OR for 5 hrs.    Estimated Blood Loss: * No values recorded between 7/17/2020  8:59 AM and 7/17/2020  2:02 PM *min         Specimens:   Specimen (12h ago, onward)    None            Discharge Note    OUTCOME: Patient tolerated treatment/procedure well without complication and is now ready for discharge.    DISPOSITION: Home or Self Care    FINAL DIAGNOSIS:  Foreign body in right foot    FOLLOWUP: In clinic    DISCHARGE INSTRUCTIONS:    Discharge Procedure Orders   Diet general     Call MD for:  redness, tenderness, or signs of infection (pain, swelling, redness, odor or green/yellow discharge around incision site)     Keep surgical extremity elevated     Remove dressing in 72 hours     Shower on day dressing removed (No bath)   Order Comments: You may shower on the 2nd day following your surgery.

## 2020-07-17 NOTE — PLAN OF CARE
Pedro Melgar has met all discharge criteria from Phase II. Vital Signs are stable, ambulating  without difficulty. Pt voided. Rn to call Dr. Kraft for weight bearing status but pt's s.o. insisted she talked to him and he said it was okay to bear weight but mostly rest and elevate foot.  Discharge instructions given, patient verbalized understanding. Discharged from facility via wheelchair in stable condition.

## 2020-07-18 LAB — BACTERIA BLD CULT: NORMAL

## 2020-07-20 LAB
FINAL PATHOLOGIC DIAGNOSIS: NORMAL
GROSS: NORMAL

## 2023-03-13 ENCOUNTER — TELEPHONE (OUTPATIENT)
Dept: HEMATOLOGY/ONCOLOGY | Facility: CLINIC | Age: 53
End: 2023-03-13
Payer: MEDICAID

## 2023-03-13 DIAGNOSIS — C43.59 MALIGNANT MELANOMA OF UPPER BACK: Primary | ICD-10-CM

## 2023-03-13 NOTE — TELEPHONE ENCOUNTER
----- Message from Ilan Valencia sent at 3/9/2023  2:11 PM CST -----  New Cancer Patient Intake Documentation    Diagnosis: malignant melanoma to left upper back    Date patient referral received:     Records collected: 03/08/23    Questions asked:  What doctor have you seen for this diagnosis?  Isacc Acosta    What imaging have you had?   Date of imaging:  Where did that occur?    Have you been diagnosed with cancer by a biopsy and/or surgery? Yes   Date of biopsy: 02/01/23  Date of surgery:  Where did that occur?  [facility]    Other pertinent info:  Sent request to [place] for staging report (one in  is not clear)  Sent request to [facility] for for clinic notes before 02/14/23

## (undated) DEVICE — SPONGE DERMACEA GAUZE 4X4

## (undated) DEVICE — SYR B-D DISP CONTROL 10CC100/C

## (undated) DEVICE — APPLICATOR CHLORAPREP ORN 26ML

## (undated) DEVICE — SOL 9P NACL IRR PIC IL

## (undated) DEVICE — GLOVE BIOGEL SKINSENSE PI 8.0

## (undated) DEVICE — SEE MEDLINE ITEM 157171

## (undated) DEVICE — SEE MEDLINE ITEM 146308

## (undated) DEVICE — SUT ETHILON 4-0 BLK MONO

## (undated) DEVICE — NDL HYPO REG 25G X 1 1/2

## (undated) DEVICE — CLOSURE SKIN STERI STRIP 1/2X4

## (undated) DEVICE — SUT VICRYL PLUS 3-0 18IN

## (undated) DEVICE — ELECTRODE BLD EXT INSUL 1

## (undated) DEVICE — SUT VICRYL PLUS 4-0 P3 18IN

## (undated) DEVICE — SEE MEDLINE ITEM 156930

## (undated) DEVICE — SUT VICRYL PLUS 3-0 SH 18IN

## (undated) DEVICE — SUT VICRYL 3-0 27 SH

## (undated) DEVICE — GOWN SMART IMP BREATHABLE XXLG

## (undated) DEVICE — GAUZE SPONGE 4X4 12PLY